# Patient Record
Sex: FEMALE | Employment: OTHER
[De-identification: names, ages, dates, MRNs, and addresses within clinical notes are randomized per-mention and may not be internally consistent; named-entity substitution may affect disease eponyms.]

---

## 2017-08-29 NOTE — PATIENT DISCUSSION
Contact lens Rx given - update. Trials disp. Brand and Pwr change. Pt will call to report on trials. RTC for CL f/u to adjust PRN.

## 2018-10-13 NOTE — PATIENT DISCUSSION
+ multiple concretions, incl larger one temporally. Discussed referral to [facility] for possible removal. Pt declines. Will f/u 4 weeks.

## 2018-11-10 NOTE — PATIENT DISCUSSION
+ multiple concretions, incl larger one temporally. Discussed referral to [facility] for possible removal. Pt declines.

## 2019-02-08 ENCOUNTER — NEW PATIENT (OUTPATIENT)
Age: 79
End: 2019-02-08

## 2019-02-11 ASSESSMENT — VISUAL ACUITY
OS_SC: 20/30
OD_SC: 20/50

## 2019-02-11 ASSESSMENT — TONOMETRY
OS_IOP_MMHG: 17
OD_IOP_MMHG: 16

## 2019-06-28 ENCOUNTER — FOLLOW UP (OUTPATIENT)
Age: 79
End: 2019-06-28

## 2019-07-01 ASSESSMENT — VISUAL ACUITY
OD_SC: 20/50+2
OS_SC: 20/25

## 2019-07-01 ASSESSMENT — TONOMETRY
OS_IOP_MMHG: 18
OD_IOP_MMHG: 16

## 2020-02-01 NOTE — PATIENT DISCUSSION
Patient is on medical hold and unable to be seen by Cardiac Rehabilitation at this time. Will check back later  Will follow to progress.     Recommended observation.